# Patient Record
Sex: FEMALE | Race: BLACK OR AFRICAN AMERICAN | NOT HISPANIC OR LATINO | URBAN - METROPOLITAN AREA
[De-identification: names, ages, dates, MRNs, and addresses within clinical notes are randomized per-mention and may not be internally consistent; named-entity substitution may affect disease eponyms.]

---

## 2018-08-27 ENCOUNTER — EMERGENCY (EMERGENCY)
Facility: HOSPITAL | Age: 53
LOS: 1 days | Discharge: HOME | End: 2018-08-27
Attending: EMERGENCY MEDICINE | Admitting: EMERGENCY MEDICINE
Payer: MEDICAID

## 2018-08-27 VITALS
DIASTOLIC BLOOD PRESSURE: 88 MMHG | RESPIRATION RATE: 18 BRPM | TEMPERATURE: 99 F | SYSTOLIC BLOOD PRESSURE: 137 MMHG | OXYGEN SATURATION: 98 % | HEART RATE: 72 BPM

## 2018-08-27 PROCEDURE — 99283 EMERGENCY DEPT VISIT LOW MDM: CPT

## 2018-08-27 RX ORDER — IBUPROFEN 200 MG
600 TABLET ORAL ONCE
Qty: 0 | Refills: 0 | Status: COMPLETED | OUTPATIENT
Start: 2018-08-27 | End: 2018-08-27

## 2018-08-27 RX ORDER — ACETAMINOPHEN 500 MG
650 TABLET ORAL ONCE
Qty: 0 | Refills: 0 | Status: COMPLETED | OUTPATIENT
Start: 2018-08-27 | End: 2018-08-27

## 2018-08-27 RX ORDER — CHLORHEXIDINE GLUCONATE 213 G/1000ML
1 SOLUTION TOPICAL
Qty: 1 | Refills: 0 | OUTPATIENT
Start: 2018-08-27

## 2018-08-27 RX ORDER — IBUPROFEN 200 MG
1 TABLET ORAL
Qty: 20 | Refills: 0 | OUTPATIENT
Start: 2018-08-27

## 2018-08-27 RX ADMIN — Medication 650 MILLIGRAM(S): at 11:02

## 2018-08-27 RX ADMIN — Medication 600 MILLIGRAM(S): at 11:02

## 2018-08-27 NOTE — ED PROVIDER NOTE - ATTENDING CONTRIBUTION TO CARE
52F p/w R sided headache x 3 days, rad from R temple region to R jaw.  No jaw claudication, no trismus, no visual change.  No fever or vomiting.  Notes R upper teeth pain alfreda while chewing, pt has caps there, lost a cap on the other side recently.  R upper tooth premolars mild swelling, no sign of abscess but there are dental implants there.  Possible early infection, likely cause of her headache.  Unlikely SAH or meningitis, no temporal artery area tenderness, normal vision, unlikely GCA.  Rx abx, pain meds, f/u with dentist soonest.  VS:  unremarkable    GEN - mild distress HA, malaise; A+O x3   HEAD - NC/AT     ENT - PEERL, EOMI, mucous membranes  moist , no discharge.  R upper tooth premolars mild swelling, no sign of abscess but there are dental implants there.     no temporal artery area tenderness, normal vision,  NECK: Neck supple, non-tender without lymphadenopathy, no masses, no JVD  PULM - CTA b/l,  symmetric breath sounds  COR -  normal heart sounds    ABD - , ND, NT, soft,  BACK - no CVA tenderness, nontender spine     EXTREMS - no edema, no deformity, warm and well perfused    SKIN - no rash or bruising      NEUROLOGIC - alert, CN 2-12 intact, sensation nl, motor no focal deficit.

## 2018-08-27 NOTE — ED PROVIDER NOTE - PLAN OF CARE
Take motrin 600mg every 8 hours as needed for pain.  Clindamycin 300mg every 8 hours for 7 days.  Peridex oral rinse daily.  Drink plenty of fluids.  Follow up with your dentist and PCP within one week.  Return to ED for any worsening pain, swelling, or any other new/concerning symptoms.

## 2018-08-27 NOTE — ED ADULT TRIAGE NOTE - CHIEF COMPLAINT QUOTE
pt states since Friday she has had a headache to the right side of her head, yesterday and today constant. pt also states tenderness to her scalp on the right side. pt states right eye tenderness, denies vision changes, denies photophobia. pt denies hitting her head.

## 2018-08-27 NOTE — ED PROVIDER NOTE - OBJECTIVE STATEMENT
53 yo female c no sig pmhx presents to ED c/o right sided headache, intermittently x 3 days.  Pt states feels like the right side of his face/scalp is sensitive to touch.  Pt also notes right upper tooth/gum pain.  Pain currently 7/10,  had taken one aleve 2 days ago with no relief.  Denies any gum swelling, fever, dizziness, cp, sob, abd pain, n/v/d, weakness, numbness, visual changes, hx of migraines, head trauma or LOC, photophobia, neck pain, recent travel, or sick contacts.

## 2018-08-27 NOTE — ED PROVIDER NOTE - CARE PLAN
Principal Discharge DX:	Headache  Assessment and plan of treatment:	Take motrin 600mg every 8 hours as needed for pain.  Clindamycin 300mg every 8 hours for 7 days.  Peridex oral rinse daily.  Drink plenty of fluids.  Follow up with your dentist and PCP within one week.  Return to ED for any worsening pain, swelling, or any other new/concerning symptoms.  Secondary Diagnosis:	Tooth pain

## 2018-08-27 NOTE — ED PROVIDER NOTE - MEDICAL DECISION MAKING DETAILS
51 yo female c no sig pmhx presents to ED c/o right sided headache, intermittently x 3 days associated with right upper tooth pain; no neuro deficits noted, pt well appearing.  Headache likely related to dental pain. Will treat with nsaids, antibx, dental follow up

## 2024-08-06 NOTE — ED PROVIDER NOTE - CONDITION AT DISCHARGE:
MEDICARE WELLNESS VISIT NOTE    HISTORY OF PRESENT ILLNESS:   [place] presents for her Subsequent Annual Medicare Wellness Visit.   She has complaints or concerns which include:  She has not taken furosemide daily as prescribed, prefers to take as needed.  She saw dermatology for redness to top of bilateral feet. Redness is improving.       Patient Care Team:  Durdevic, Dragana, MD as PCP - General (Internal Medicine)  Jennifer Walker, RN as Registered Nurse (RN Cancer Nurse Navigator)  Ha Shepherd MD as Patient Defined Provider to Notify (Hematology & Oncology)  Sadiq Beltran MD as Patient Defined Provider to Notify (Internal Medicine - Pulmonary Disease)  Jonel Andrade MD (Internal Medicine- Interventional Cardiology)  Tino Sykes OD as Referring Provider (Optometry)  Elpidio Bartlett DO (Physical Medicine - Rehabilitation)  Johana Gomez APNP as Nurse Practitioner (Nurse Practitioner - Family)  Hai Yanez PA-C (Physician Assistant)  Frida Cook APNP as Nurse Practitioner (Nurse Practitioner)        Patient Active Problem List   Diagnosis    Vitamin D deficiency    Other emphysema  (CMD)    Encounter for therapeutic drug monitoring    Anticoagulated on Coumadin    Malignant neoplasm of upper lobe of right lung  (CMD)    Chronic obstructive pulmonary disease  (CMD)    Malignant neoplasm of upper lobe, bronchus or lung    Acute radiation pneumonitis  (CMD)    Atrial flutter with rapid ventricular response  (CMD)    Major depressive disorder, recurrent episode, mild (CMD)    Colostomy status  (CMD)    Age-related osteoporosis with current pathological fracture of vertebra with delayed healing    Long term current use of anticoagulant therapy    Weakness    Acquired hypothyroidism    Left hip pain    Neuropathic pain    SI (sacroiliac) joint inflammation (CMD)    History of pulmonary embolism    Former smoker    Dependence on supplemental oxygen    Chronic respiratory failure  with hypoxia  (CMD)    Hyperlipidemia LDL goal <100    Stage 3a chronic kidney disease  (CMD)    Therapeutic drug monitoring    Single subsegmental pulmonary embolism without acute cor pulmonale  (CMD)         Past Medical History:   Diagnosis Date    Closed fracture of right ankle with routine healing     Colostomy in place  (CMD) 2022    COPD (chronic obstructive pulmonary disease)  (CMD)     COPD exacerbation  (CMD) 2022    Diverticulitis     Diverticulitis of colon with perforation 2/10/2022    Essential hypertension     Malignant neoplasm  (CMD)     Pulmonary infiltrate 2022    Sepsis due to undetermined organism  (CMD) 2/10/2022    Single subsegmental pulmonary embolism without acute cor pulmonale  (CMD) 2021    Vertigo          Past Surgical History:   Procedure Laterality Date    Ankle fracture surgery Right     Dr Henderson    Appendectomy N/A 2022    Biopsy of breast, incisional Left     Benign per patient.    Central line insertion  2022    Colposcopy cervix & upper / adjacent vagina      Removal colon/ileostomy N/A 2022    sigmoid colon         Social History     Tobacco Use    Smoking status: Former     Current packs/day: 0.00     Average packs/day: 0.5 packs/day for 40.0 years (20.0 ttl pk-yrs)     Types: Cigarettes     Start date: 1980     Quit date: 2020     Years since quittin.0     Passive exposure: Past    Smokeless tobacco: Never   Vaping Use    Vaping status: never used   Substance Use Topics    Alcohol use: Not Currently    Drug use: Never     Drug use:    Drug Use:    Never           Family History   Problem Relation Age of Onset    Cancer Mother         skin     Cancer Father         pancreatic cancer with mets to liver    Cancer, Pancreatic Father     Obesity Sister        Current Outpatient Medications   Medication Sig Dispense Refill    warfarin (COUMADIN) 2 MG tablet TAKE 1 TABLET BY MOUTH IN THE EVENING ,THEN  ON  MONDAY  PM  TAKE   ALONG  WITH  1MG  TABLET  EQUALS  3  MG 90 tablet 3    warfarin (COUMADIN) 1 MG tablet (warfarin) Take one tablet by mouth on Monday and Friday 90 tablet 3    furosemide (LASIX) 20 MG tablet Take 1 tablet by mouth daily as needed (leg swelling). 30 tablet 0    albuterol 108 (90 Base) MCG/ACT inhaler Inhale 2 puffs into the lungs every 4 hours as needed for Shortness of Breath or Wheezing. 1 each 11    buPROPion (Wellbutrin SR) 150 MG 12 hr tablet Take 1 tablet by mouth 2 times daily. 180 tablet 3    fluticasone-salmeterol (Advair Diskus) 250-50 MCG/ACT inhaler Inhale 1 puff into the lungs in the morning and 1 puff in the evening. 60 each 11    levothyroxine 25 MCG tablet Take 1 tablet by mouth daily. 90 tablet 3    neomycin-polymyxin-hydroCORTisone (CORTISPORIN) 3.5-53192-2 ophthalmic suspension Place 1 drop into both eyes 4 times daily. 7.5 mL 0    ipratropium-albuterol (DUONEB) 0.5-2.5 (3) MG/3ML nebulizer solution Take 3 mLs by nebulization every 4 hours as needed for Wheezing or Shortness of Breath. 300 mL 11    polyethylene glycol (MIRALAX) 17 g packet Take 17 g by mouth as needed.      diphenhydrAMINE (BENADRYL) 25 MG capsule Take 25 mg by mouth every 6 hours as needed for Itching. (Patient not taking: Reported on 8/6/2024)      oxygen (O2) gas Inhale 2 L/min into the lungs continuous.      docusate sodium-sennosides (SENOKOT S) 50-8.6 MG per tablet Take 2 tablets by mouth 2 times daily.       No current facility-administered medications for this visit.        The following items on the Medicare Health Risk Assessment were found to be positive  1.) Do you have an Advance directive, living will, or power of  for health care document that contains your wishes for end of life care?: I don't know     3.) During the past 4 weeks, how would you rate your health?: Fair     4.) During the past 4 weeks, what was the hardest physical activity you could do for at least 2 minutes?: Light     5.) Do you do moderate  to strenuous exercise (brisk walk) for about 20 minutes for 3 or more days per week?: No, I usually do not exercise this much     6 a.) How many servings of Fruits and Vegetables do you have each day ( 1 serving = 1 piece of fruit, 1/2 cup fruits or vegetables): 1 per day     6 b.) How many servings of High Fiber / Whole Grain Foods to you have each day ( 1 serving = 1 cup cold cereal, 1/2 cup cooked cereal, 1 slice bread): 1 per day     6 d.) How many servings of Sugar Sweetened Beverages do you have each day ( 1 serving = 1 can or 12 oz cup of sode or juice): 3 per day     7b.) Do you feel unsteady when standing or walking?: Yes     7c.) Do you worry about falling?: Yes     8.) During the past 4 weeks, has your physical and emotional health limited your social activities with family, friends, neighbors, or other groups?: Moderately     11e.) Tiredness or Fatigue: Often     12.) Do you need help with any of the following activities?   (check all that apply): Bathing     15.) How confident are you that you can control and manage most of your health problems?: Somewhat confident         Vision and Hearing screens:   Vision Screening    Right eye Left eye Both eyes   Without correction      With correction 20/200 20/30 20/30   Hearing Screening - Comments:: Whisper screening test results:  Right - abnormal  Left - abnormal  Failed whisper test. Could not hear 2 out of 2 words bilaterally     Advance care planning documents on file - yes     Cognitive/Functional Status: no evidence of cognitive dysfunction by direct observation    Opioid Review: [place] is not taking opioid medications.    Recent PHQ 2/9 Score:    PHQ 2:  PHQ 2 Score Adult PHQ 2 Score Adult PHQ 2 Interpretation Little interest or pleasure in activity?   8/6/2024  11:34 AM 2 No further screening needed 1       PHQ 9:  PHQ 9 Score Adult PHQ 9 Score Adult PHQ 9 Interpretation   8/18/2022   9:51 AM 4 Minimal Depression       DEPRESSION  ASSESSMENT/PLAN:  Depression screening is negative no further plan needed.     Depression Screening-2 Questions: Patient was asked \"Over the past 2 weeks, how often have you been bothered by any of the following problems? Little interest or pleasure in doing things? and Feeling down, depressed, or hopeless? Patient scored 2 points.     SOCIAL HISTORY:  Seatbelt use: Yes  Sunscreen use: Yes  Hand rails on stairways: Yes  Grab bars in bathrooms: Yes, currently has suction cup ones in the shower, but is planning on having better grab bars installed    Throw Rugs: No  Smoke detectors: Yes  Carbon monoxide detector: Yes  Fire safety plan: Yes      Ambulation/Fall Risk: Pt. walks with a  walker  Patient is unsteady on their feet and has a High Risk for falls.        Health Maintenance       Shingles Vaccine (1 of 2)  Never done    DTaP/Tdap/Td Vaccine (1 - Tdap)  Overdue since 10/31/1997    Osteoporosis Screening (Once)  Never done    COVID-19 Vaccine (4 - 2023-24 season)  Overdue since 9/1/2023    Traditional Medicare- Medicare Wellness Visit (Yearly)  Overdue since 7/1/2024    DM/CKD Microalbumin (Yearly)  Ordered on 8/6/2024           Following review of the above:  Pended orders for microalbumin and MWV  Patient is not proceeding with: Osteoporosis screening, COVID-19, Dtap/Tdap/Td, and Shingles    Note: Refer to final orders and clinician documentation.            Body mass index is 31 kg/m².    BMI FOLLOW-UP/PLAN:  BMI is in obese range.    Caloric restriction and Low carbohydrate diet       Needed Screening/Treatment:   Cardiovascular screening - Lipids , Bone density, and Immunizations reviewed and patient needs: COVID-19, Tetanus, and shingles     Needed follow up:  None    See orders.   See Patient Instructions section.   Return in about 1 year (around 8/6/2025) for Medicare Wellness Visit.       Visit Vitals  /70 (BP Location: RUE - Right upper extremity, Patient Position: Sitting, Cuff Size: Large Adult)    Pulse 86   Temp 98.3 °F (36.8 °C) (Oral)   Ht 5' 6.5\" (1.689 m)   Wt 88.5 kg (195 lb)   SpO2 96%   BMI 31.00 kg/m²        Physical Exam  Constitutional:       General: She is not in acute distress.     Appearance: Normal appearance.   HENT:      Head: Normocephalic and atraumatic.      Nose: Nose normal.      Neck: Normal range of motion and neck supple.   Eyes:      Extraocular Movements: Extraocular movements intact.      Pupils: Pupils are equal, round, and reactive to light.   Cardiovascular:      Rate and Rhythm: Normal rate and regular rhythm.   Pulmonary:      Effort: Pulmonary effort is normal.      Comments: Diminished breath sounds bilaterally  Abdominal:      Palpations: Abdomen is soft.      Tenderness: There is no abdominal tenderness.      Comments: colostomy   Musculoskeletal:         General: Normal range of motion.      Comments: Minimal bilateral lower extremity edema   Skin:     General: Skin is warm and dry.   Neurological:      General: No focal deficit present.      Mental Status: She is alert and oriented to person, place, and time.   Psychiatric:         Mood and Affect: Mood normal.         Behavior: Behavior normal.         Thought Content: Thought content normal.         Judgment: Judgment normal.          Assessment and plan:  1.  Medicare annual wellness visit, subsequent  2.  Bilateral lower extremity edema: Improved with Lasix 20 mg daily.  She can start taking medication as needed.  3.  Was diagnosed with eczema by dermatologist.  She started using aqua for and the redness improved.  She is glad the redness improved, but she does not agree on diagnosis.  Continue with Aquaphor as needed.  4. A flutter:  She had an episode of a flutter when she was hospitalized with colon rupture.  In normal sinus rhythm today.  Anticoagulated on warfarin.  Follows with Cardiology.   5. Hypothyroidism:  Chronic and well controlled on levothyroxine 25 mcg daily.  6. Malignant neoplasm of upper lobe of  right lung.  Emphysema.  History of PE.  Chronic hypoxic respiratory failure:  Anticoagulated on warfarin.  She is followed by oncology and pulmonology.  No evidence of cancer recurrence.  She is on supplemental oxygen at all times, currently on 2L.  7.  SI joint inflammation: Used to see pain management, no complaints at the moment.        Dragana Durdevic, MD    Satisfactory